# Patient Record
Sex: FEMALE | Race: AMERICAN INDIAN OR ALASKA NATIVE | NOT HISPANIC OR LATINO | Employment: OTHER | URBAN - METROPOLITAN AREA
[De-identification: names, ages, dates, MRNs, and addresses within clinical notes are randomized per-mention and may not be internally consistent; named-entity substitution may affect disease eponyms.]

---

## 2018-04-20 ENCOUNTER — TELEPHONE (OUTPATIENT)
Dept: OBSTETRICS AND GYNECOLOGY | Facility: CLINIC | Age: 33
End: 2018-04-20

## 2018-04-20 NOTE — TELEPHONE ENCOUNTER
----- Message from Luba Estrella sent at 4/20/2018  3:58 PM CDT -----  Contact: self  Pt is calling in regards to scheduling an annual appt and also to speak with Dr. Johnson about some pain she is having. Books aren't currently open to schedule this appt. Pt would like a call back to do so.    Pt can be reached at [phone].    Thank you

## 2018-04-20 NOTE — TELEPHONE ENCOUNTER
Spoke with pt who was requesting to schedule an appt for a WWE.    Pt scheduled on 5/1/18 at 2pm.    Pt verbalized understanding of time, date, and location of appt.  Pt thanked me for call.

## 2018-05-01 ENCOUNTER — HOSPITAL ENCOUNTER (OUTPATIENT)
Dept: RADIOLOGY | Facility: OTHER | Age: 33
Discharge: HOME | End: 2018-05-01
Attending: OBSTETRICS & GYNECOLOGY
Payer: MEDICAID

## 2018-05-01 ENCOUNTER — OFFICE VISIT (OUTPATIENT)
Dept: OBSTETRICS AND GYNECOLOGY | Facility: CLINIC | Age: 33
End: 2018-05-01
Payer: MEDICAID

## 2018-05-01 VITALS
DIASTOLIC BLOOD PRESSURE: 86 MMHG | SYSTOLIC BLOOD PRESSURE: 120 MMHG | BODY MASS INDEX: 39.22 KG/M2 | HEIGHT: 64 IN | WEIGHT: 229.75 LBS

## 2018-05-01 DIAGNOSIS — R10.32 LLQ PAIN: Primary | ICD-10-CM

## 2018-05-01 DIAGNOSIS — N94.12 DEEP DYSPAREUNIA: ICD-10-CM

## 2018-05-01 DIAGNOSIS — R10.32 LLQ PAIN: ICD-10-CM

## 2018-05-01 PROCEDURE — 76856 US EXAM PELVIC COMPLETE: CPT | Mod: 26,,, | Performed by: RADIOLOGY

## 2018-05-01 PROCEDURE — 99999 PR PBB SHADOW E&M-EST. PATIENT-LVL III: CPT | Mod: PBBFAC,,, | Performed by: OBSTETRICS & GYNECOLOGY

## 2018-05-01 PROCEDURE — 76830 TRANSVAGINAL US NON-OB: CPT | Mod: 26,,, | Performed by: RADIOLOGY

## 2018-05-01 PROCEDURE — 99213 OFFICE O/P EST LOW 20 MIN: CPT | Mod: PBBFAC | Performed by: OBSTETRICS & GYNECOLOGY

## 2018-05-01 PROCEDURE — 99214 OFFICE O/P EST MOD 30 MIN: CPT | Mod: S$PBB,,, | Performed by: OBSTETRICS & GYNECOLOGY

## 2018-05-01 PROCEDURE — 76830 TRANSVAGINAL US NON-OB: CPT | Mod: TC

## 2018-05-01 RX ORDER — NORETHINDRONE 5 MG/1
5 TABLET ORAL DAILY
Qty: 30 TABLET | Refills: 11 | Status: SHIPPED | OUTPATIENT
Start: 2018-05-01 | End: 2020-09-10

## 2018-05-01 NOTE — PROGRESS NOTES
Subjective:       Patient ID: Violeta Padgett is a 32 y.o. female.    Chief Complaint:  Abdominal Pain (after sex) and Pelvic Pain (after sex)      History of Present Illness  HPI  Pt is 32 y.o. who here for evaluation of pain.  Pt states that she has long hx of llq pain.  Nl bm.  But when the pain starts, it can make it difficult to walk.  Now hurts with intercourse (since last 2 years).  And this went away when she was pregnant.  No cycles, so hard to time the symptoms.  But happens 1-2x/week.    GYN & OB History  No LMP recorded (lmp unknown).   Date of Last Pap: 2015    OB History    Para Term  AB Living   1 1       1   SAB TAB Ectopic Multiple Live Births           1      # Outcome Date GA Lbr Lay[place]/2nd Weight Sex Delivery Anes PTL Lv   1 Para    2.778 kg (6 lb 2 oz) F Vag-Spont  Y           Review of Systems  Review of Systems   Constitutional: Negative for activity change, appetite change and fatigue.   HENT: Negative.  Negative for tinnitus.    Eyes: Negative.    Respiratory: Negative for cough and shortness of breath.    Cardiovascular: Negative for chest pain and palpitations.   Gastrointestinal: Negative.  Negative for abdominal pain, blood in stool, constipation, diarrhea and nausea.   Endocrine: Negative.  Negative for hot flashes.   Genitourinary: Positive for dyspareunia. Negative for dysuria, frequency, menstrual problem, pelvic pain, vaginal discharge, dysmenorrhea, urinary incontinence and postcoital bleeding.   Musculoskeletal: Negative for back pain and joint swelling.   Skin:  Negative for rash.   Neurological: Negative.  Negative for headaches.   Hematological: Negative.  Does not bruise/bleed easily.   Psychiatric/Behavioral: The patient is not nervous/anxious.    Breast: negative.  Negative for breast mass, nipple discharge and skin changes          Objective:    Physical Exam:   Constitutional: She is oriented to person, place, and time. She appears well-developed and  well-nourished. No distress.    HENT:   Head: Normocephalic and atraumatic.    Eyes: Conjunctivae and lids are normal. Pupils are equal, round, and reactive to light.    Neck: Normal range of motion and full passive range of motion without pain. Neck supple.    Cardiovascular: Normal rate and regular rhythm.  Exam reveals no edema.     Pulmonary/Chest: Effort normal and breath sounds normal. She has no wheezes.        Abdominal: Soft. Normal appearance and bowel sounds are normal. She exhibits no distension. There is no tenderness. There is no rebound and no guarding.     Genitourinary: Vagina normal and uterus normal. Pelvic exam was performed with patient supine. There is no tenderness or lesion on the right labia. There is no tenderness or lesion on the left labia. Uterus is not deviated, not fixed and not hosting fibroids. Cervix is normal. Right adnexum displays no mass and no tenderness. Left adnexum displays no mass and no tenderness. No rectocele, cystocele or unspecified prolapse of vaginal walls in the vagina. No vaginal discharge found. Labial bartholins normal.Cervix exhibits no motion tenderness and no discharge. Additional cervical findings: IUD strings visualized          Musculoskeletal: Normal range of motion and moves all extremeties.       Neurological: She is alert and oriented to person, place, and time. She has normal strength.    Skin: Skin is warm and dry.    Psychiatric: She has a normal mood and affect. Her speech is normal and behavior is normal. Thought content normal. Her mood appears not anxious. She does not exhibit a depressed mood. She expresses no suicidal plans and no homicidal plans.          Assessment:        1. LLQ pain    2. Deep dyspareunia                Plan:      Violeta was seen today for abdominal pain and pelvic pain.    Diagnoses and all orders for this visit:    LLQ pain  -     US Pelvis Complete Non OB; Future  -     norethindrone (AYGESTIN) 5 mg Tab; Take 1 tablet (5  mg total) by mouth once daily.  We discussed the different sources of this type of pain.  Given that she has bowel symptoms, it may very well be microscopic endometriosis.  We'll try to treat with Aygestin.  We will also get an ultrasound to confirm no ovarian cyst.  Patient advised to keep a pain calendar.  We'll have patient follow up in 4-6 weeks for pain reassessment and well woman exam.  Deep dyspareunia  -     US Pelvis Complete Non OB; Future  -     norethindrone (AYGESTIN) 5 mg Tab; Take 1 tablet (5 mg total) by mouth once daily.

## 2020-09-03 ENCOUNTER — TELEPHONE (OUTPATIENT)
Dept: OBSTETRICS AND GYNECOLOGY | Facility: CLINIC | Age: 35
End: 2020-09-03

## 2020-09-03 NOTE — TELEPHONE ENCOUNTER
----- Message from Iker Zelaya, Patient Care Assistant sent at 9/3/2020  1:03 PM CDT -----  Name of Who is Calling: OSVALDO CRUZ [[id number]]    What is the request in detail:Requesting a call back in regards of having IUD removed, stating she is in pain and spotting.   Please contact to further discuss and advise      Can the clinic reply by [facility]: No    What Number to Call Back if not in [facility]:   [phone]

## 2020-09-03 NOTE — TELEPHONE ENCOUNTER
Spoke with pt  Pt is wanting to see Dr Johnson for pelvic pain and irregular bleeding.  Pt declined same day appt and was scheduled for appt next week.    Pt verbalized understating.

## 2020-09-10 ENCOUNTER — OFFICE VISIT (OUTPATIENT)
Dept: OBSTETRICS AND GYNECOLOGY | Facility: CLINIC | Age: 35
End: 2020-09-10
Payer: MEDICAID

## 2020-09-10 ENCOUNTER — LAB VISIT (OUTPATIENT)
Dept: LAB | Facility: OTHER | Age: 35
End: 2020-09-10
Attending: OBSTETRICS & GYNECOLOGY
Payer: MEDICAID

## 2020-09-10 VITALS
SYSTOLIC BLOOD PRESSURE: 130 MMHG | HEIGHT: 64 IN | DIASTOLIC BLOOD PRESSURE: 60 MMHG | WEIGHT: 230.38 LBS | BODY MASS INDEX: 39.33 KG/M2

## 2020-09-10 DIAGNOSIS — Z12.4 SCREENING FOR CERVICAL CANCER: ICD-10-CM

## 2020-09-10 DIAGNOSIS — E03.9 ACQUIRED HYPOTHYROIDISM: ICD-10-CM

## 2020-09-10 DIAGNOSIS — Z30.431 ENCOUNTER FOR ROUTINE CHECKING OF INTRAUTERINE CONTRACEPTIVE DEVICE (IUD): ICD-10-CM

## 2020-09-10 DIAGNOSIS — Z01.419 WELL WOMAN EXAM WITH ROUTINE GYNECOLOGICAL EXAM: Primary | ICD-10-CM

## 2020-09-10 PROBLEM — Z97.5 IUD (INTRAUTERINE DEVICE) IN PLACE: Status: ACTIVE | Noted: 2019-10-25

## 2020-09-10 LAB
T4 FREE SERPL-MCNC: 0.9 NG/DL (ref 0.71–1.51)
TSH SERPL DL<=0.005 MIU/L-ACNC: 2.19 UIU/ML (ref 0.4–4)

## 2020-09-10 PROCEDURE — 84439 ASSAY OF FREE THYROXINE: CPT

## 2020-09-10 PROCEDURE — 99999 PR PBB SHADOW E&M-EST. PATIENT-LVL III: ICD-10-PCS | Mod: PBBFAC,,, | Performed by: OBSTETRICS & GYNECOLOGY

## 2020-09-10 PROCEDURE — 87624 HPV HI-RISK TYP POOLED RSLT: CPT

## 2020-09-10 PROCEDURE — 99395 PR PREVENTIVE VISIT,EST,18-39: ICD-10-PCS | Mod: S$PBB,,, | Performed by: OBSTETRICS & GYNECOLOGY

## 2020-09-10 PROCEDURE — 84481 FREE ASSAY (FT-3): CPT

## 2020-09-10 PROCEDURE — 88175 CYTOPATH C/V AUTO FLUID REDO: CPT

## 2020-09-10 PROCEDURE — 84443 ASSAY THYROID STIM HORMONE: CPT

## 2020-09-10 PROCEDURE — 36415 COLL VENOUS BLD VENIPUNCTURE: CPT

## 2020-09-10 PROCEDURE — 99213 OFFICE O/P EST LOW 20 MIN: CPT | Mod: PBBFAC | Performed by: OBSTETRICS & GYNECOLOGY

## 2020-09-10 PROCEDURE — 99999 PR PBB SHADOW E&M-EST. PATIENT-LVL III: CPT | Mod: PBBFAC,,, | Performed by: OBSTETRICS & GYNECOLOGY

## 2020-09-10 PROCEDURE — 99395 PREV VISIT EST AGE 18-39: CPT | Mod: S$PBB,,, | Performed by: OBSTETRICS & GYNECOLOGY

## 2020-09-10 RX ORDER — OMEPRAZOLE 20 MG/1
CAPSULE, DELAYED RELEASE ORAL
COMMUNITY
Start: 2020-08-13

## 2020-09-10 RX ORDER — FLUOXETINE HYDROCHLORIDE 40 MG/1
CAPSULE ORAL
COMMUNITY
Start: 2020-08-13

## 2020-09-10 RX ORDER — BUPROPION HYDROCHLORIDE 150 MG/1
TABLET, EXTENDED RELEASE ORAL
COMMUNITY
Start: 2020-08-13

## 2020-09-10 RX ORDER — HYDROXYZINE HYDROCHLORIDE 10 MG/1
10 TABLET, FILM COATED ORAL DAILY
COMMUNITY
Start: 2020-08-13

## 2020-09-10 RX ORDER — LORATADINE 10 MG/1
10 TABLET ORAL DAILY
COMMUNITY
Start: 2020-08-13

## 2020-09-10 RX ORDER — LEVOTHYROXINE SODIUM 25 UG/1
25 TABLET ORAL DAILY
COMMUNITY
Start: 2020-08-13

## 2020-09-10 RX ORDER — MELOXICAM 15 MG/1
15 TABLET ORAL DAILY PRN
COMMUNITY
Start: 2020-08-13

## 2020-09-10 RX ORDER — CETIRIZINE HYDROCHLORIDE 10 MG/1
10 TABLET ORAL DAILY
COMMUNITY
Start: 2020-08-13

## 2020-09-10 RX ORDER — METHOCARBAMOL 500 MG/1
TABLET, FILM COATED ORAL
COMMUNITY
Start: 2020-07-18

## 2020-09-10 NOTE — PROGRESS NOTES
Subjective:       Patient ID: Violeta Padgett is a 35 y.o. female.    Chief Complaint:  Well Woman (needs to replace IUD)      History of Present Illness  HPI  Annual Exam-Premenopausal  Patient presents for annual exam. The patient has no complaints today. The patient is sexually active. GYN screening history: last pap: was normal. The patient wears seatbelts: yes. The patient participates in regular exercise: yes. Has the patient ever been transfused or tattooed?: no. The patient reports that there is not domestic violence in her life.    Other issue includes weight gain.  Has had mirena x 5 years and needs it replaced.  But not sure if this has caused weight gain.  She does have hypothyroidism as well.    GYN & OB History  No LMP recorded (exact date). Patient has had an implant.   Date of Last Pap: 2015    OB History    Para Term  AB Living   1 1       1   SAB TAB Ectopic Multiple Live Births           1      # Outcome Date GA Lbr Lay[place]/2nd Weight Sex Delivery Anes PTL Lv   1 Para    2.778 kg (6 lb 2 oz) F Vag-Spont  Y        Review of Systems  Review of Systems   Constitutional: Positive for unexpected weight change. Negative for activity change, appetite change and fatigue.   HENT: Negative.  Negative for tinnitus.    Eyes: Negative.    Respiratory: Negative for cough and shortness of breath.    Cardiovascular: Negative for chest pain and palpitations.   Gastrointestinal: Negative.  Negative for abdominal pain, blood in stool, constipation, diarrhea and nausea.   Endocrine: Negative.  Negative for hot flashes.   Genitourinary: Negative for dysmenorrhea, dyspareunia, dysuria, frequency, menstrual problem, pelvic pain, vaginal discharge, urinary incontinence and postcoital bleeding.   Musculoskeletal: Negative for back pain and joint swelling.   Integumentary:  Negative for rash, breast mass, nipple discharge and breast skin changes.   Neurological: Negative.  Negative for headaches.    Hematological: Negative.  Does not bruise/bleed easily.   Psychiatric/Behavioral: The patient is not nervous/anxious.    Breast: negative.  Negative for mass, nipple discharge and skin changes          Objective:    Physical Exam:   Constitutional: She appears well-developed and well-nourished. She is cooperative. She does not appear ill. No distress.    HENT:   Head: Normocephalic and atraumatic.   Nose: Nose normal.   Mouth/Throat: Oropharynx is clear and moist.    Eyes: Pupils are equal, round, and reactive to light. Conjunctivae, EOM and lids are normal.    Neck: Full passive range of motion without pain. No muscular tenderness present. No neck rigidity. No thyroid mass and no thyromegaly present.    Cardiovascular: Normal rate, regular rhythm, S1 normal, S2 normal, normal heart sounds and normal pulses.     Pulmonary/Chest: Effort normal and breath sounds normal. No accessory muscle usage. Right breast exhibits no inverted nipple, no mass, no nipple discharge, no skin change, no tenderness and no swelling. Left breast exhibits no inverted nipple, no mass, no nipple discharge, no skin change, no tenderness and no swelling.        Abdominal: Soft. Normal appearance and bowel sounds are normal. She exhibits no distension and no mass. There is no abdominal tenderness. There is no rigidity, no rebound and no guarding.     Genitourinary:    Vagina and uterus normal.      Pelvic exam was performed with patient supine.   There is no tenderness or injury on the right labia. There is no tenderness or injury on the left labia. Uterus is not deviated, not hosting fibroids and not experiencing uterine prolapse. Cervix is normal. Right adnexum displays no mass and no fullness. Left adnexum displays no mass and no fullness. No erythema, rectocele or cystocele in the vagina. Labial bartholins normal.Cervix exhibits no motion tenderness and no discharge. Additional cervical findings: IUD strings visualizednegative for vaginal  discharge              Neurological: She is alert. She has normal strength.    Skin: Skin is warm and dry.    Psychiatric: She has a normal mood and affect. Her behavior is normal. Thought content normal. Her mood appears not anxious. Her affect is not inappropriate. Her speech is not slurred. She is not agitated and not aggressive. Thought content is not paranoid. She expresses no suicidal plans and no homicidal plans.          Assessment:        1. Well woman exam with routine gynecological exam    2. Acquired hypothyroidism    3. Screening for cervical cancer    4. Encounter for routine checking of intrauterine contraceptive device (IUD)                Plan:      Violeta was seen today for well woman.    Diagnoses and all orders for this visit:    Well woman exam with routine gynecological exam  -     Liquid-Based Pap Smear, Screening  -     HPV High Risk Genotypes, PCR    Acquired hypothyroidism  -     TSH; Future  -     T4, free; Future  -     T3, free; Future    Screening for cervical cancer  -     Liquid-Based Pap Smear, Screening  -     HPV High Risk Genotypes, PCR    Encounter for routine checking of intrauterine contraceptive device (IUD)  We discussed Liletta vs Paraguard IUD.  Pt will research both.  Once she decides, will notify office so we can pre-cert and coordinate removal/re-insertion.  She is not a good pill taker.  Wants something more natural.

## 2020-09-11 LAB — T3FREE SERPL-MCNC: 2.7 PG/ML (ref 2.3–4.2)

## 2020-09-15 LAB
HPV HR 12 DNA SPEC QL NAA+PROBE: NEGATIVE
HPV16 AG SPEC QL: NEGATIVE
HPV18 DNA SPEC QL NAA+PROBE: NEGATIVE

## 2020-09-21 ENCOUNTER — PATIENT MESSAGE (OUTPATIENT)
Dept: OBSTETRICS AND GYNECOLOGY | Facility: CLINIC | Age: 35
End: 2020-09-21

## 2020-09-21 DIAGNOSIS — Z97.5 IUD CONTRACEPTION: Primary | ICD-10-CM

## 2020-09-24 LAB
FINAL PATHOLOGIC DIAGNOSIS: NORMAL
Lab: NORMAL

## 2020-10-05 ENCOUNTER — TELEPHONE (OUTPATIENT)
Dept: OBSTETRICS AND GYNECOLOGY | Facility: CLINIC | Age: 35
End: 2020-10-05

## 2020-10-05 NOTE — TELEPHONE ENCOUNTER
Spoke with pt  Pt is approved for Paragard thorough Buy and Bill.  Pt schedule to have Paragard inserted and will have currant IUD removed.    Pt verbalized understaing

## 2020-10-12 ENCOUNTER — PROCEDURE VISIT (OUTPATIENT)
Dept: OBSTETRICS AND GYNECOLOGY | Facility: CLINIC | Age: 35
End: 2020-10-12
Payer: MEDICAID

## 2020-10-12 VITALS
DIASTOLIC BLOOD PRESSURE: 68 MMHG | HEIGHT: 64 IN | WEIGHT: 230.63 LBS | BODY MASS INDEX: 39.37 KG/M2 | SYSTOLIC BLOOD PRESSURE: 110 MMHG

## 2020-10-12 DIAGNOSIS — Z30.433 ENCOUNTER FOR REMOVAL AND REINSERTION OF INTRAUTERINE CONTRACEPTIVE DEVICE (IUD): ICD-10-CM

## 2020-10-12 DIAGNOSIS — Z97.5 CONTRACEPTION, DEVICE INTRAUTERINE: Primary | ICD-10-CM

## 2020-10-12 LAB
B-HCG UR QL: NEGATIVE
CTP QC/QA: YES

## 2020-10-12 PROCEDURE — 58300 INSERT INTRAUTERINE DEVICE: CPT | Mod: PBBFAC | Performed by: OBSTETRICS & GYNECOLOGY

## 2020-10-12 PROCEDURE — 58300 INSERTION OF INTRAUTERINE DEVICE: ICD-10-PCS | Mod: S$PBB,,, | Performed by: OBSTETRICS & GYNECOLOGY

## 2020-10-12 PROCEDURE — 58301 REMOVE INTRAUTERINE DEVICE: CPT | Mod: 25,PBBFAC | Performed by: OBSTETRICS & GYNECOLOGY

## 2020-10-12 PROCEDURE — 58301 REMOVAL OF INTRAUTERINE DEVICE: ICD-10-PCS | Mod: S$PBB,,, | Performed by: OBSTETRICS & GYNECOLOGY

## 2020-10-12 RX ADMIN — COPPER 380 MM: 313.4 INTRAUTERINE DEVICE INTRAUTERINE at 10:10

## 2020-10-12 NOTE — LETTER
October 12, 2020      [facility] [address]  [address] [facility] [address] [address]  Phone: [phone]       Patient: Violeta Padgett   YOB: 1985  Date of Visit: 10/12/2020    To Whom It May Concern:    Boris Padgett  was at [facility] on 10/12/2020. She may return to school on 10/13/2020 with no restrictions. If you have any questions or concerns, or if I can be of further assistance, please do not hesitate to contact me.    Sincerely,      Avinash Johnson M.D.

## 2020-10-12 NOTE — PROCEDURES
INSERTION OF INTRAUTERINE DEVICE    Date/Time: 10/12/2020 10:30 AM  Performed by: Avinash Johnson MD  Authorized by: Avinash Johnson MD     Consent:     Consent obtained:  Written    Consent given by:  Patient    Procedure risks and benefits discussed: yes      Patient questions answered: yes      Patient agrees, verbalizes understanding, and wants to proceed: yes      Educational handouts given: yes      Instructions and paperwork completed: yes    Procedure:     Pelvic exam performed: yes      Negative urine pregnancy test: yes      Cervix cleaned and prepped: yes      Speculum placed in vagina: yes      Tenaculum applied to cervix: yes      Uterus sounded: yes      Uterus sound depth (cm):  8    IUD inserted with no complications: yes      IUD type:  ParaGard    Strings trimmed: yes    Post-procedure:     Patient tolerated procedure well: yes      Patient will follow up after next period: yes

## 2020-10-12 NOTE — PROCEDURES
Removal of Intrauterine Device    Date/Time: 10/12/2020 10:30 AM  Performed by: Avinash Johnson MD  Authorized by: Avinash Johnson MD   Preparation: Patient was prepped and draped in the usual sterile fashion.  Local anesthesia used: no    Anesthesia:  Local anesthesia used: no    Sedation:  Patient sedated: no    Patient tolerance: patient tolerated the procedure well with no immediate complications  Comments: PROCEDURE:     PRE-IUD REMOVAL COUNSELING:  The patient was advised of minimal risks of bleeding and pain and she agrees to proceed.    PROCEDURE:  TIME OUT PERFORMED.  IUD strings were visualized at the os and grasped. IUD removed with gentle traction.  The patient tolerated the procedure well      POST IUD REMOVAL COUNSELING:  Expect period-like flow to occur after Mirena IUD removal and periods to return to pre-IUD pattern.  Manage post IUD removal cramping with NSAIDs, Tylenol or Rx per [employer].    POST IUD REMOVAL CONTRACEPTION:(Post IUD removal contraception)    Counseling lasted approximately 15 minutes and all her questions were answered.    FOLLOW-UP: With me for annual gyn exam or prn.

## 2020-10-19 ENCOUNTER — OFFICE VISIT (OUTPATIENT)
Dept: URGENT CARE | Facility: CLINIC | Age: 35
End: 2020-10-19
Payer: MEDICAID

## 2020-10-19 VITALS
TEMPERATURE: 98 F | WEIGHT: 229 LBS | DIASTOLIC BLOOD PRESSURE: 81 MMHG | SYSTOLIC BLOOD PRESSURE: 126 MMHG | HEIGHT: 64 IN | HEART RATE: 83 BPM | OXYGEN SATURATION: 97 % | BODY MASS INDEX: 39.09 KG/M2

## 2020-10-19 DIAGNOSIS — B34.9 ACUTE VIRAL SYNDROME: Primary | ICD-10-CM

## 2020-10-19 LAB
CTP QC/QA: YES
SARS-COV-2 RDRP RESP QL NAA+PROBE: NEGATIVE

## 2020-10-19 PROCEDURE — 99203 OFFICE O/P NEW LOW 30 MIN: CPT | Mod: S$GLB,,, | Performed by: NURSE PRACTITIONER

## 2020-10-19 PROCEDURE — 99203 PR OFFICE/OUTPT VISIT, NEW, LEVL III, 30-44 MIN: ICD-10-PCS | Mod: S$GLB,,, | Performed by: NURSE PRACTITIONER

## 2020-10-19 PROCEDURE — U0002: ICD-10-PCS | Mod: QW,S$GLB,, | Performed by: NURSE PRACTITIONER

## 2020-10-19 PROCEDURE — U0002 COVID-19 LAB TEST NON-CDC: HCPCS | Mod: QW,S$GLB,, | Performed by: NURSE PRACTITIONER

## 2020-10-19 NOTE — LETTER
[address] ? [place], [address] ? Phone [phone] ? Fax [phone]           Return to Work/School    Patient: Violeta Padgett  YOB: 1985   Date: 10/19/2020      To Whom It May Concern:     Violeta Padgett was in contact with/seen in my office on 10/19/2020. COVID-19 is present in our communities across the [facility]. Not all patients are eligible or appropriate to be tested. In this situation, your employee meets the following criteria:     Violeta Padgett has met the criteria for COVID-19 testing and has a NEGATIVE result. The employee can return to work once they are asymptomatic for 24 hours without the use of fever reducing medications (Tylenol, Motrin, etc).     If you have any questions or concerns, or if I can be of further assistance, please do not hesitate to contact me.     Sincerely,    Mina Huizar NP

## 2020-10-19 NOTE — PROGRESS NOTES
"Subjective:       Patient ID: Violeta Padgett is a 35 y.o. female.    Vitals:  height is 5' 4" (1.626 m) and weight is 103.9 kg (229 lb). Her temperature is 98.1 °F (36.7 °C). Her blood pressure is 126/81 and her pulse is 83. Her oxygen saturation is 97%.     Chief Complaint: COVID-19 Concerns    This is a 35 y.o. female who presents today with a chief complaint of fever, patient reports T-max 99°, patient reports ear pain, congestion and sore throat with cough and nausea with diarrhea, patient reports she had diarrhea and  fever last week also which resolved, patient denies purulent sputum production, patient reports sputum is clear, patient reports mild shortness of breath for 2 days, denies currently, patient denies loss of taste or smell, denies any exposure to COVID-19,  Patient presents with covid concerns, headache nausea fever coughing with shortness of breath that started over the weekend.  Denies chest pain, dizziness, or positional lightheadedness, patient reports she did ate out last night and after that she started with diarrhea, denies urinary frequency, urgency, dysuria or hematuria    Cough  This is a new problem. The current episode started in the past 7 days. The problem has been unchanged. The problem occurs constantly. The cough is non-productive. Associated symptoms include ear pain, a fever, headaches, a sore throat and shortness of breath (mild). Pertinent negatives include no chills, eye redness, hemoptysis, myalgias, rash or wheezing. Nothing aggravates the symptoms. She has tried body position changes and steroid inhaler for the symptoms. The treatment provided no relief.       Constitution: Positive for fever. Negative for chills, sweating and fatigue.   HENT: Positive for ear pain, congestion and sore throat. Negative for sinus pain, sinus pressure and voice change.    Neck: Negative for painful lymph nodes.   Eyes: Negative for eye redness.   Respiratory: Positive for cough and " shortness of breath (mild). Negative for chest tightness, sputum production, bloody sputum, COPD, stridor, wheezing and asthma.    Gastrointestinal: Positive for nausea. Negative for vomiting.   Musculoskeletal: Negative for muscle ache.   Skin: Negative for rash.   Allergic/Immunologic: Negative for seasonal allergies and asthma.   Neurological: Positive for headaches.   Hematologic/Lymphatic: Negative for swollen lymph nodes.       Objective:      Physical Exam   Constitutional: She is oriented to person, place, and time. She appears well-developed. She is cooperative.  Non-toxic appearance. She does not appear ill. No distress.   HENT:   Head: Normocephalic and atraumatic.   Ears:   Right Ear: Hearing, tympanic membrane, external ear and ear canal normal.   Left Ear: Hearing, tympanic membrane, external ear and ear canal normal.   Nose: Nose normal. No mucosal edema, rhinorrhea or nasal deformity. No epistaxis. Right sinus exhibits no maxillary sinus tenderness and no frontal sinus tenderness. Left sinus exhibits no maxillary sinus tenderness and no frontal sinus tenderness.   Mouth/Throat: Uvula is midline, oropharynx is clear and moist and mucous membranes are normal. No trismus in the jaw. Normal dentition. No uvula swelling. No oropharyngeal exudate, posterior oropharyngeal edema or posterior oropharyngeal erythema.   Eyes: Conjunctivae and lids are normal. No scleral icterus.   Neck: Trachea normal, full passive range of motion without pain and phonation normal. Neck supple. No neck rigidity. No edema and no erythema present.   Cardiovascular: Normal rate, regular rhythm, normal heart sounds and normal pulses.   Pulmonary/Chest: Effort normal and breath sounds normal. No respiratory distress. She has no decreased breath sounds. She has no rhonchi.   Abdominal: Normal appearance.   Musculoskeletal: Normal range of motion.         General: No deformity.   Neurological: She is alert and oriented to person,  place, and time. She exhibits normal muscle tone. Coordination normal.   Skin: Skin is warm, dry, intact, not diaphoretic and not pale. Psychiatric: Her speech is normal and behavior is normal. Judgment and thought content normal.   Nursing note and vitals reviewed.        Results for orders placed or performed in visit on 10/19/20   POCT COVID-19 Rapid Screening   Result Value Ref Range    POC Rapid COVID Negative Negative     Acceptable Yes          Patient in no acute distress and nontoxic appearing.  Vitals reassuring.  Negative COVID-19 results discussed with patient in detail.  I offered flu test and chest x-ray, patient declined at this time.  Red flags discussed with patient in detail.  Discussed results/diagnosis/plan in depth with patient in clinic. Strict precautions given to patient to monitor for worsening signs and symptoms. Advised to follow up with primary.All questions answered. Strict ER precautions given. If your symptoms worsens of fail to improve you should go to the Emergency Room. Discharge and follow-up instructions given verbally/printed. Patient voiced understanding and in agreement with current treatment plan.        Assessment:       1. Acute viral syndrome        Plan:         Acute viral syndrome  -     POCT COVID-19 Rapid Screening      Patient Instructions   If your condition worsens or fails to improve we recommend that you receive another evaluation at the ER immediately or contact your PCP to discuss your concerns or return here. You must understand that you've received an urgent care treatment only and that you may be released before all your medical problems are known or treated. You the patient will arrange for followup care as instructed.    If you were prescribed antibiotics, please take them to completion.  If you were prescribed a narcotic medication, do not drive or operate heavy equipment or machinery while taking these medications.  Please follow up with  "your primary care doctor or specialist as needed.  If you  smoke, please stop smoking.    Viral Syndrome (Adult)  A viral illness may cause a number of symptoms. The symptoms depend on the part of the body that the virus affects. If it settles in your nose, throat, and lungs, it may cause cough, sore throat, congestion, and sometimes headache. If it settles in your stomach and intestinal tract, it may cause vomiting and diarrhea. Sometimes it causes vague symptoms like "aching all over," feeling tired, loss of appetite, or fever.  A viral illness usually lasts 1 to 2 weeks, but sometimes it lasts longer. In some cases, a more serious infection can look like a viral syndrome in the first few days of the illness. You may need another exam and additional tests to know the difference. Watch for the warning signs listed below.  Home care  Follow these guidelines for taking care of yourself at home:  · If symptoms are severe, rest at home for the first 2 to 3 days.  · Stay away from cigarette smoke - both your smoke and the smoke from others.  · You may use over-the-counter acetaminophen or ibuprofen for fever, muscle aching, and headache, unless another medicine was prescribed for this. If you have chronic liver or kidney disease or ever had a stomach ulcer or GI bleeding, talk with your doctor before using these medicines. No one who is younger than 18 and ill with a fever should take aspirin. It may cause severe disease or death.  · Your appetite may be poor, so a light diet is fine. Avoid dehydration by drinking 8 to 12 8-ounce glasses of fluids each day. This may include water; orange juice; lemonade; apple, grape, and cranberry juice; clear fruit drinks; electrolyte replacement and sports drinks; and decaffeinated teas and coffee. If you have been diagnosed with a kidney disease, ask your doctor how much and what types of fluids you should drink to prevent dehydration. If you have kidney disease, drinking too much " fluid can cause it build up in the your body and be dangerous to your health.  · Over-the-counter remedies won't shorten the length of the illness but may be helpful for cough, sore throat; and nasal and sinus congestion. Don't use decongestants if you have high blood pressure.  Follow-up care  Follow up with your healthcare provider if you do not improve over the next week.  Call 911  Get emergency medical care if any of the following occur:  · Convulsion  · Feeling weak, dizzy, or like you are going to faint  · Chest pain, shortness of breath, wheezing, or difficulty breathing  When to seek medical advice  Call your healthcare provider right away if any of these occur:  · Cough with lots of colored sputum (mucus) or blood in your sputum  · Chest pain, shortness of breath, wheezing, or difficulty breathing  · Severe headache; face, neck, or ear pain  · Severe, constant pain in the lower right side of your belly (abdominal)  · Continued vomiting (cant keep liquids down)  · Frequent diarrhea (more than 5 times a day); blood (red or black color) or mucus in diarrhea  · Feeling weak, dizzy, or like you are going to faint  · Extreme thirst  · Fever of 100.4°F (38°C) or higher, or as directed by your healthcare provider  Date Last Reviewed: 9/25/2015  © 4425-7813 [employer] [address] [place] [address], [place] [address]. All rights reserved. This information is not intended as a substitute for professional medical care. Always follow your healthcare professional's instructions.

## 2020-10-19 NOTE — PATIENT INSTRUCTIONS
"If your condition worsens or fails to improve we recommend that you receive another evaluation at the ER immediately or contact your PCP to discuss your concerns or return here. You must understand that you've received an urgent care treatment only and that you may be released before all your medical problems are known or treated. You the patient will arrange for followup care as instructed.    If you were prescribed antibiotics, please take them to completion.  If you were prescribed a narcotic medication, do not drive or operate heavy equipment or machinery while taking these medications.  Please follow up with your primary care doctor or specialist as needed.  If you  smoke, please stop smoking.    Viral Syndrome (Adult)  A viral illness may cause a number of symptoms. The symptoms depend on the part of the body that the virus affects. If it settles in your nose, throat, and lungs, it may cause cough, sore throat, congestion, and sometimes headache. If it settles in your stomach and intestinal tract, it may cause vomiting and diarrhea. Sometimes it causes vague symptoms like "aching all over," feeling tired, loss of appetite, or fever.  A viral illness usually lasts 1 to 2 weeks, but sometimes it lasts longer. In some cases, a more serious infection can look like a viral syndrome in the first few days of the illness. You may need another exam and additional tests to know the difference. Watch for the warning signs listed below.  Home care  Follow these guidelines for taking care of yourself at home:  · If symptoms are severe, rest at home for the first 2 to 3 days.  · Stay away from cigarette smoke - both your smoke and the smoke from others.  · You may use over-the-counter acetaminophen or ibuprofen for fever, muscle aching, and headache, unless another medicine was prescribed for this. If you have chronic liver or kidney disease or ever had a stomach ulcer or GI bleeding, talk with your doctor before using " these medicines. No one who is younger than 18 and ill with a fever should take aspirin. It may cause severe disease or death.  · Your appetite may be poor, so a light diet is fine. Avoid dehydration by drinking 8 to 12 8-ounce glasses of fluids each day. This may include water; orange juice; lemonade; apple, grape, and cranberry juice; clear fruit drinks; electrolyte replacement and sports drinks; and decaffeinated teas and coffee. If you have been diagnosed with a kidney disease, ask your doctor how much and what types of fluids you should drink to prevent dehydration. If you have kidney disease, drinking too much fluid can cause it build up in the your body and be dangerous to your health.  · Over-the-counter remedies won't shorten the length of the illness but may be helpful for cough, sore throat; and nasal and sinus congestion. Don't use decongestants if you have high blood pressure.  Follow-up care  Follow up with your healthcare provider if you do not improve over the next week.  Call 911  Get emergency medical care if any of the following occur:  · Convulsion  · Feeling weak, dizzy, or like you are going to faint  · Chest pain, shortness of breath, wheezing, or difficulty breathing  When to seek medical advice  Call your healthcare provider right away if any of these occur:  · Cough with lots of colored sputum (mucus) or blood in your sputum  · Chest pain, shortness of breath, wheezing, or difficulty breathing  · Severe headache; face, neck, or ear pain  · Severe, constant pain in the lower right side of your belly (abdominal)  · Continued vomiting (cant keep liquids down)  · Frequent diarrhea (more than 5 times a day); blood (red or black color) or mucus in diarrhea  · Feeling weak, dizzy, or like you are going to faint  · Extreme thirst  · Fever of 100.4°F (38°C) or higher, or as directed by your healthcare provider  Date Last Reviewed: 9/25/2015  © 0232-3147 [employer]. [address] [place] [address]  Road, [place] [address]. All rights reserved. This information is not intended as a substitute for professional medical care. Always follow your healthcare professional's instructions.

## 2021-04-16 ENCOUNTER — PATIENT MESSAGE (OUTPATIENT)
Dept: RESEARCH | Facility: HOSPITAL | Age: 36
End: 2021-04-16